# Patient Record
Sex: FEMALE | Race: WHITE | Employment: STUDENT | URBAN - METROPOLITAN AREA
[De-identification: names, ages, dates, MRNs, and addresses within clinical notes are randomized per-mention and may not be internally consistent; named-entity substitution may affect disease eponyms.]

---
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
## 2023-01-03 ENCOUNTER — HOSPITAL ENCOUNTER (OUTPATIENT)
Age: 18
Setting detail: SPECIMEN
Discharge: HOME | End: 2023-01-03

## 2023-01-03 ENCOUNTER — TELEPHONE (OUTPATIENT)
Dept: PEDIATRIC GASTROENTEROLOGY | Age: 18
End: 2023-01-03

## 2023-01-03 DIAGNOSIS — H22 TUBULOINTERSTITIAL NEPHRITIS AND UVEITIS: ICD-10-CM

## 2023-01-03 DIAGNOSIS — N12 TUBULOINTERSTITIAL NEPHRITIS AND UVEITIS: ICD-10-CM

## 2023-01-03 LAB
CREATININE URINE: 192.6 MG/DL (ref 28–217)
TOTAL PROTEIN, URINE: 9 MG/DL
URINE TOTAL PROTEIN CREATININE RATIO: 0.05 (ref 0–0.2)

## 2023-01-03 NOTE — TELEPHONE ENCOUNTER
Sw met with pt and mom. Pt is home from her school break and will begin next Monday. Pt appears to be happy and has a great support from parents. No needs expressed from pt or parents. Sw will remain available for support.

## 2023-02-08 NOTE — PROGRESS NOTES
Attending Physician Statement     I have discussed the care of Pat Thomas, including pertinent history and exam findings with the resident. I have reviewed and edited their note in the electronic medical record. I have seen and examined the patient and the key elements of all parts of the encounter have been performed/reviewed by me . I agree with the assessment, plan and orders as documented by the resident. All questions addressed. Attending's Name:  Bull Cardoso.  Kassandra Sanford MD Problem: Discharge Planning  Goal: Discharge to home or other facility with appropriate resources  2/8/2023 1425 by Lionel Batista RN  Outcome: Adequate for Discharge  2/8/2023 1425 by Lionel Batista RN  Outcome: Adequate for Discharge     Problem: Pain  Goal: Verbalizes/displays adequate comfort level or baseline comfort level  2/8/2023 1425 by Lionel Batista RN  Outcome: Adequate for Discharge  2/8/2023 1425 by Lionel Batista RN  Outcome: Adequate for Discharge     Problem: ABCDS Injury Assessment  Goal: Absence of physical injury  2/8/2023 1425 by Lionel Batista RN  Outcome: Adequate for Discharge  2/8/2023 1425 by Lionel Batista RN  Outcome: Adequate for Discharge

## 2024-05-22 NOTE — PATIENT INSTRUCTIONS
Stop steroid   Continue good hydration  Labs/urine in one month  Follow up 3-4 months
self-care/home management

## (undated) DEVICE — TOWEL,OR,DSP,ST,NATURAL,DLX,4/PK,20PK/CS: Brand: MEDLINE

## (undated) DEVICE — BIOPINCE™  FULL CORE BIOPSY INSTRUMENT 18G X 15CM: Brand: BIOPINCE™  FULL CORE BIOPSY INSTRUMENT

## (undated) DEVICE — SOLUTION PREP POVIDONE IOD FOR SKIN MUCOUS MEM PRIOR TO

## (undated) DEVICE — GLOVE ORANGE PI 8 1/2   MSG9085

## (undated) DEVICE — BANDAGE ADH W0.75XL3IN NAT PLAS CURAD

## (undated) DEVICE — Z DISCONTINUED BY MEDLINE USE 2711682 TRAY SKIN PREP DRY W/ PREM GLV

## (undated) DEVICE — COVER US PRB W15XL120CM W/ GEL RUBBERBAND TAPE STRP FLD GEN

## (undated) DEVICE — GAUZE,SPONGE,4"X4",16PLY,XRAY,STRL,LF: Brand: MEDLINE

## (undated) DEVICE — DRAPE,REIN 53X77,STERILE: Brand: MEDLINE

## (undated) DEVICE — INTENDED FOR TISSUE SEPARATION, AND OTHER PROCEDURES THAT REQUIRE A SHARP SURGICAL BLADE TO PUNCTURE OR CUT.: Brand: BARD-PARKER ® CARBON RIB-BACK BLADES